# Patient Record
Sex: FEMALE | Race: WHITE | NOT HISPANIC OR LATINO | URBAN - METROPOLITAN AREA
[De-identification: names, ages, dates, MRNs, and addresses within clinical notes are randomized per-mention and may not be internally consistent; named-entity substitution may affect disease eponyms.]

---

## 2017-04-09 ENCOUNTER — EMERGENCY (EMERGENCY)
Facility: HOSPITAL | Age: 47
LOS: 1 days | Discharge: HOME | End: 2017-04-09
Attending: EMERGENCY MEDICINE | Admitting: EMERGENCY MEDICINE
Payer: COMMERCIAL

## 2017-04-09 VITALS
HEART RATE: 98 BPM | TEMPERATURE: 98 F | DIASTOLIC BLOOD PRESSURE: 80 MMHG | OXYGEN SATURATION: 100 % | RESPIRATION RATE: 18 BRPM | SYSTOLIC BLOOD PRESSURE: 125 MMHG

## 2017-04-09 VITALS
DIASTOLIC BLOOD PRESSURE: 86 MMHG | RESPIRATION RATE: 16 BRPM | OXYGEN SATURATION: 98 % | HEART RATE: 87 BPM | SYSTOLIC BLOOD PRESSURE: 110 MMHG

## 2017-04-09 PROCEDURE — 99284 EMERGENCY DEPT VISIT MOD MDM: CPT | Mod: 25

## 2017-04-09 PROCEDURE — 99283 EMERGENCY DEPT VISIT LOW MDM: CPT | Mod: 25

## 2017-04-09 PROCEDURE — 93005 ELECTROCARDIOGRAM TRACING: CPT

## 2017-04-09 PROCEDURE — 93010 ELECTROCARDIOGRAM REPORT: CPT | Mod: NC

## 2017-04-09 RX ORDER — MECLIZINE HCL 12.5 MG
12.5 TABLET ORAL ONCE
Qty: 0 | Refills: 0 | Status: COMPLETED | OUTPATIENT
Start: 2017-04-09 | End: 2017-04-09

## 2017-04-09 RX ADMIN — Medication 12.5 MILLIGRAM(S): at 10:34

## 2017-04-09 NOTE — ED PROVIDER NOTE - ATTENDING CONTRIBUTION TO CARE
intermittent vertigo symptoms with secondary palpitations and nausea that resolved spontaneously, on my exam:  pleasant, cooperative, thin, unable to reproduce symptoms, no nystagmus Bro Fried MD (attending)

## 2017-04-09 NOTE — ED PROVIDER NOTE - OBJECTIVE STATEMENT
45yo p/w "fast heart rate," dizziness this morning when getting out of bed. Pt. got back in bed. Reports dizziness was prior to tachycardia. history of SVT, s/p ablation. Reports that this does not feel like her previous SVT. Denies shortness of breath, chest pain, prolonged stasis.

## 2017-04-09 NOTE — ED ADULT NURSE NOTE - OBJECTIVE STATEMENT
45 y/o F, reported to ED from home via EMS. A&Ox3, c/o dizziness and heart palpitations. Pt reports that she got up this morning and had a sudden onset dizziness. Pt reports that she felt as if the whole room was spinning and she was spinning simultaneously. Pt denies "blacking out" and denies falling. Pt reports that she just couldn't stand up on her own. Pt reports that her episode lasted about 15 minutes. 47 y/o F, reported to ED from home via EMS. A&Ox3, c/o dizziness and heart palpitations. Pt reports that she got up this morning and had a sudden onset dizziness. Pt reports that she felt as if the whole room was spinning and she was spinning simultaneously. Pt denies "blacking out" and denies falling. Pt reports that she just couldn't stand up on her own. Pt reports that her episode lasted about 15 minutes. Pt reports that she felt nauseous but denies vomiting. Pt reports that during this episode she was unstable on her feet.  Pt's BP was 144/105 right arm and 143/104 left arm as per EMS upon their arrival. Pt denies LOC, H/A, SOB, C/P, V/D, abd pain. Pt denies fever or chills. Pt denies sick contact. Pt has a hx of SVT, ablation and depression.  at bedside. Will continue to monitor pt.

## 2017-04-13 DIAGNOSIS — R00.0 TACHYCARDIA, UNSPECIFIED: ICD-10-CM

## 2017-04-13 DIAGNOSIS — R42 DIZZINESS AND GIDDINESS: ICD-10-CM

## 2017-04-13 DIAGNOSIS — R00.2 PALPITATIONS: ICD-10-CM

## 2017-09-30 ENCOUNTER — EMERGENCY (EMERGENCY)
Facility: HOSPITAL | Age: 47
LOS: 1 days | Discharge: HOME | End: 2017-09-30
Attending: EMERGENCY MEDICINE | Admitting: EMERGENCY MEDICINE
Payer: COMMERCIAL

## 2017-09-30 VITALS
DIASTOLIC BLOOD PRESSURE: 77 MMHG | RESPIRATION RATE: 19 BRPM | SYSTOLIC BLOOD PRESSURE: 109 MMHG | HEART RATE: 82 BPM | OXYGEN SATURATION: 100 %

## 2017-09-30 VITALS
HEART RATE: 97 BPM | OXYGEN SATURATION: 100 % | SYSTOLIC BLOOD PRESSURE: 142 MMHG | DIASTOLIC BLOOD PRESSURE: 96 MMHG | RESPIRATION RATE: 20 BRPM | TEMPERATURE: 98 F

## 2017-09-30 LAB
ALBUMIN SERPL ELPH-MCNC: 5 G/DL — SIGNIFICANT CHANGE UP (ref 3.3–5)
ALP SERPL-CCNC: 36 U/L — LOW (ref 40–120)
ALT FLD-CCNC: 18 U/L RC — SIGNIFICANT CHANGE UP (ref 10–45)
ANION GAP SERPL CALC-SCNC: 15 MMOL/L — SIGNIFICANT CHANGE UP (ref 5–17)
APPEARANCE UR: CLEAR — SIGNIFICANT CHANGE UP
APTT BLD: 26.3 SEC — LOW (ref 27.5–37.4)
AST SERPL-CCNC: 24 U/L — SIGNIFICANT CHANGE UP (ref 10–40)
BILIRUB SERPL-MCNC: 0.6 MG/DL — SIGNIFICANT CHANGE UP (ref 0.2–1.2)
BILIRUB UR-MCNC: NEGATIVE — SIGNIFICANT CHANGE UP
BUN SERPL-MCNC: 19 MG/DL — SIGNIFICANT CHANGE UP (ref 7–23)
CALCIUM SERPL-MCNC: 9.7 MG/DL — SIGNIFICANT CHANGE UP (ref 8.4–10.5)
CHLORIDE SERPL-SCNC: 98 MMOL/L — SIGNIFICANT CHANGE UP (ref 96–108)
CO2 SERPL-SCNC: 23 MMOL/L — SIGNIFICANT CHANGE UP (ref 22–31)
COLOR SPEC: YELLOW — SIGNIFICANT CHANGE UP
CREAT SERPL-MCNC: 0.75 MG/DL — SIGNIFICANT CHANGE UP (ref 0.5–1.3)
D DIMER BLD IA.RAPID-MCNC: <150 NG/ML DDU — SIGNIFICANT CHANGE UP
DIFF PNL FLD: NEGATIVE — SIGNIFICANT CHANGE UP
GLUCOSE SERPL-MCNC: 124 MG/DL — HIGH (ref 70–99)
GLUCOSE UR QL: NEGATIVE — SIGNIFICANT CHANGE UP
HCG SERPL-ACNC: <2 MIU/ML — SIGNIFICANT CHANGE UP
HCT VFR BLD CALC: 41.3 % — SIGNIFICANT CHANGE UP (ref 34.5–45)
HGB BLD-MCNC: 14.8 G/DL — SIGNIFICANT CHANGE UP (ref 11.5–15.5)
INR BLD: 0.9 RATIO — SIGNIFICANT CHANGE UP (ref 0.88–1.16)
KETONES UR-MCNC: ABNORMAL
LEUKOCYTE ESTERASE UR-ACNC: NEGATIVE — SIGNIFICANT CHANGE UP
MCHC RBC-ENTMCNC: 35.9 GM/DL — SIGNIFICANT CHANGE UP (ref 32–36)
MCHC RBC-ENTMCNC: 36.1 PG — HIGH (ref 27–34)
MCV RBC AUTO: 100 FL — SIGNIFICANT CHANGE UP (ref 80–100)
NITRITE UR-MCNC: NEGATIVE — SIGNIFICANT CHANGE UP
PH UR: 6.5 — SIGNIFICANT CHANGE UP (ref 5–8)
PLATELET # BLD AUTO: 312 K/UL — SIGNIFICANT CHANGE UP (ref 150–400)
POTASSIUM SERPL-MCNC: 4.6 MMOL/L — SIGNIFICANT CHANGE UP (ref 3.5–5.3)
POTASSIUM SERPL-SCNC: 4.6 MMOL/L — SIGNIFICANT CHANGE UP (ref 3.5–5.3)
PROT SERPL-MCNC: 8.3 G/DL — SIGNIFICANT CHANGE UP (ref 6–8.3)
PROT UR-MCNC: NEGATIVE — SIGNIFICANT CHANGE UP
PROTHROM AB SERPL-ACNC: 9.8 SEC — SIGNIFICANT CHANGE UP (ref 9.8–12.7)
RBC # BLD: 4.12 M/UL — SIGNIFICANT CHANGE UP (ref 3.8–5.2)
RBC # FLD: 11.2 % — SIGNIFICANT CHANGE UP (ref 10.3–14.5)
SODIUM SERPL-SCNC: 136 MMOL/L — SIGNIFICANT CHANGE UP (ref 135–145)
SP GR SPEC: 1.02 — SIGNIFICANT CHANGE UP (ref 1.01–1.02)
TROPONIN T SERPL-MCNC: <0.01 NG/ML — SIGNIFICANT CHANGE UP (ref 0–0.06)
TROPONIN T SERPL-MCNC: <0.01 NG/ML — SIGNIFICANT CHANGE UP (ref 0–0.06)
UROBILINOGEN FLD QL: NEGATIVE — SIGNIFICANT CHANGE UP
WBC # BLD: 5.3 K/UL — SIGNIFICANT CHANGE UP (ref 3.8–10.5)
WBC # FLD AUTO: 5.3 K/UL — SIGNIFICANT CHANGE UP (ref 3.8–10.5)

## 2017-09-30 PROCEDURE — 80053 COMPREHEN METABOLIC PANEL: CPT

## 2017-09-30 PROCEDURE — 84702 CHORIONIC GONADOTROPIN TEST: CPT

## 2017-09-30 PROCEDURE — 84484 ASSAY OF TROPONIN QUANT: CPT

## 2017-09-30 PROCEDURE — 85027 COMPLETE CBC AUTOMATED: CPT

## 2017-09-30 PROCEDURE — 87086 URINE CULTURE/COLONY COUNT: CPT

## 2017-09-30 PROCEDURE — 85610 PROTHROMBIN TIME: CPT

## 2017-09-30 PROCEDURE — 99285 EMERGENCY DEPT VISIT HI MDM: CPT | Mod: 25

## 2017-09-30 PROCEDURE — 85379 FIBRIN DEGRADATION QUANT: CPT

## 2017-09-30 PROCEDURE — 81003 URINALYSIS AUTO W/O SCOPE: CPT

## 2017-09-30 PROCEDURE — 93010 ELECTROCARDIOGRAM REPORT: CPT

## 2017-09-30 PROCEDURE — 99284 EMERGENCY DEPT VISIT MOD MDM: CPT | Mod: 25

## 2017-09-30 PROCEDURE — 85730 THROMBOPLASTIN TIME PARTIAL: CPT

## 2017-09-30 PROCEDURE — 93005 ELECTROCARDIOGRAM TRACING: CPT

## 2017-09-30 RX ORDER — SODIUM CHLORIDE 9 MG/ML
1000 INJECTION INTRAMUSCULAR; INTRAVENOUS; SUBCUTANEOUS ONCE
Qty: 0 | Refills: 0 | Status: COMPLETED | OUTPATIENT
Start: 2017-09-30 | End: 2017-09-30

## 2017-09-30 RX ADMIN — SODIUM CHLORIDE 1000 MILLILITER(S): 9 INJECTION INTRAMUSCULAR; INTRAVENOUS; SUBCUTANEOUS at 10:20

## 2017-09-30 NOTE — ED PROVIDER NOTE - MEDICAL DECISION MAKING DETAILS
Pt is a 46 y/o female with PMH of SVT who p/w c/o palpitations. Pt is a 48 y/o female with PMH of SVT who p/w c/o palpitations. Patient states that this AM she had 2 cups of coffee and went to the gym, and while working out at 4:30 am she felt her heart racing and a "burning sensation" in her chest, now resolved. 1.5 hrs prior to coming to the ED, she had another similar episode of tachycardia and burning in her chest which prompted her to come in for an evaluation. Denies recent f/c, travel/trauma/immobilization, leg pain or swelling. She also takes an amphetamine-based medication for concentration.   DDx: pSVT, other arrhythmia, dehydration. Less likely: PE, ACS  Plan: basic labs, ekg, upreg, trop x 2, ddimer

## 2017-09-30 NOTE — ED PROVIDER NOTE - PHYSICAL EXAMINATION
Physical Exam: NAD, AAOx3, NCAT, MMM, neck is supple, PERRL, CTAB, + resolved tachycardia and regular rhythm, abdomen is soft and NTND, No edema, No deformity of extremities, No rashes, CN grossly intact, No focal motor or sensory deficits. ~ Micky Shin MD

## 2017-09-30 NOTE — ED PROVIDER NOTE - NS ED ROS FT
No fever, no chills, no change in vision, no change in hearing, + chest pain, + shortness of breath, no abdominal pain, no vomiting, no dysuria, no muscle pain, no rashes, no loss of consciousness. ~ Micky Shin MD

## 2017-09-30 NOTE — ED PROVIDER NOTE - OBJECTIVE STATEMENT
Micky Shin MD (resident): 47 F w/ Hx of SVT s/p ablation 10 yrs ago, who p/w palpitations. Today pt had coffee, and went to work-out with cardio at 4:30 am and started to have palpitations and lightheadedness and found HR was 150 by pulse check. Pt had mild chest tightness and burning this episode w/ mild SOB. Palp lasted for 2 hours. Pt did not have syncope. No recent travel, immobilization, leg swelling, OCPs, or recent surgery/traumas.     PMD: Luis Parekh  Card: Arrhythmia clinic at [place]

## 2017-09-30 NOTE — ED PROVIDER NOTE - PROGRESS NOTE DETAILS
NINA Barton MD: 2nd trop and d-dimer negative. Pt continues to feel well, has had no further episodes of CP/palpitations. Plan is to d/c home with instructions to f/u with her Cardiologist in 1-2 days.

## 2017-09-30 NOTE — ED ADULT NURSE NOTE - OBJECTIVE STATEMENT
48 yo female presents in the ed for palpitations and feeling a fast hr. Pt is alert and oriented x4, speaking coherently. pt states that she has been having a fast hr on and off for the past month. Pt states that she woke up this morning, drank 2 cups of coffee and tried to work out and she felt diophretic and palpitation. Pt states that she did not have chest pain. Pt lung clear to ascultation, abdomen soft, non tender. Pt denies chest pain, SOB, fever, chills, nausea, vomiting. MD at bedside, will continue to reassess. pt states that she had a history of SVT with an ablation 10 years ago

## 2017-10-01 LAB
CULTURE RESULTS: SIGNIFICANT CHANGE UP
SPECIMEN SOURCE: SIGNIFICANT CHANGE UP

## 2017-11-16 ENCOUNTER — TRANSCRIPTION ENCOUNTER (OUTPATIENT)
Age: 47
End: 2017-11-16

## 2018-06-01 ENCOUNTER — TRANSCRIPTION ENCOUNTER (OUTPATIENT)
Age: 48
End: 2018-06-01

## 2018-06-20 ENCOUNTER — EMERGENCY (EMERGENCY)
Facility: HOSPITAL | Age: 48
LOS: 1 days | Discharge: HOME | End: 2018-06-20
Attending: EMERGENCY MEDICINE
Payer: COMMERCIAL

## 2018-06-20 ENCOUNTER — TRANSCRIPTION ENCOUNTER (OUTPATIENT)
Age: 48
End: 2018-06-20

## 2018-06-20 VITALS
OXYGEN SATURATION: 100 % | DIASTOLIC BLOOD PRESSURE: 93 MMHG | RESPIRATION RATE: 16 BRPM | SYSTOLIC BLOOD PRESSURE: 140 MMHG | HEART RATE: 83 BPM

## 2018-06-20 VITALS
OXYGEN SATURATION: 100 % | SYSTOLIC BLOOD PRESSURE: 130 MMHG | RESPIRATION RATE: 18 BRPM | DIASTOLIC BLOOD PRESSURE: 88 MMHG | HEART RATE: 82 BPM

## 2018-06-20 LAB
ALBUMIN SERPL ELPH-MCNC: 4.3 G/DL — SIGNIFICANT CHANGE UP (ref 3.3–5)
ALP SERPL-CCNC: 37 U/L — LOW (ref 40–120)
ALT FLD-CCNC: 11 U/L — SIGNIFICANT CHANGE UP (ref 10–45)
ANION GAP SERPL CALC-SCNC: 14 MMOL/L — SIGNIFICANT CHANGE UP (ref 5–17)
AST SERPL-CCNC: 27 U/L — SIGNIFICANT CHANGE UP (ref 10–40)
BASOPHILS # BLD AUTO: 0 K/UL — SIGNIFICANT CHANGE UP (ref 0–0.2)
BASOPHILS NFR BLD AUTO: 0.8 % — SIGNIFICANT CHANGE UP (ref 0–2)
BILIRUB SERPL-MCNC: 0.4 MG/DL — SIGNIFICANT CHANGE UP (ref 0.2–1.2)
BUN SERPL-MCNC: 12 MG/DL — SIGNIFICANT CHANGE UP (ref 7–23)
CALCIUM SERPL-MCNC: 9.3 MG/DL — SIGNIFICANT CHANGE UP (ref 8.4–10.5)
CHLORIDE SERPL-SCNC: 99 MMOL/L — SIGNIFICANT CHANGE UP (ref 96–108)
CO2 SERPL-SCNC: 21 MMOL/L — LOW (ref 22–31)
CREAT SERPL-MCNC: 0.64 MG/DL — SIGNIFICANT CHANGE UP (ref 0.5–1.3)
D DIMER BLD IA.RAPID-MCNC: <150 NG/ML DDU — SIGNIFICANT CHANGE UP
EOSINOPHIL # BLD AUTO: 0.1 K/UL — SIGNIFICANT CHANGE UP (ref 0–0.5)
EOSINOPHIL NFR BLD AUTO: 1.2 % — SIGNIFICANT CHANGE UP (ref 0–6)
GLUCOSE SERPL-MCNC: 97 MG/DL — SIGNIFICANT CHANGE UP (ref 70–99)
HCT VFR BLD CALC: 42.9 % — SIGNIFICANT CHANGE UP (ref 34.5–45)
HGB BLD-MCNC: 14.6 G/DL — SIGNIFICANT CHANGE UP (ref 11.5–15.5)
LYMPHOCYTES # BLD AUTO: 0.9 K/UL — LOW (ref 1–3.3)
LYMPHOCYTES # BLD AUTO: 18.2 % — SIGNIFICANT CHANGE UP (ref 13–44)
MAGNESIUM SERPL-MCNC: 2 MG/DL — SIGNIFICANT CHANGE UP (ref 1.6–2.6)
MCHC RBC-ENTMCNC: 32.9 PG — SIGNIFICANT CHANGE UP (ref 27–34)
MCHC RBC-ENTMCNC: 34 GM/DL — SIGNIFICANT CHANGE UP (ref 32–36)
MCV RBC AUTO: 96.6 FL — SIGNIFICANT CHANGE UP (ref 80–100)
MONOCYTES # BLD AUTO: 0.4 K/UL — SIGNIFICANT CHANGE UP (ref 0–0.9)
MONOCYTES NFR BLD AUTO: 7.8 % — SIGNIFICANT CHANGE UP (ref 2–14)
NEUTROPHILS # BLD AUTO: 3.8 K/UL — SIGNIFICANT CHANGE UP (ref 1.8–7.4)
NEUTROPHILS NFR BLD AUTO: 72 % — SIGNIFICANT CHANGE UP (ref 43–77)
PHOSPHATE SERPL-MCNC: 3.1 MG/DL — SIGNIFICANT CHANGE UP (ref 2.5–4.5)
PLATELET # BLD AUTO: 233 K/UL — SIGNIFICANT CHANGE UP (ref 150–400)
POTASSIUM SERPL-MCNC: 4.4 MMOL/L — SIGNIFICANT CHANGE UP (ref 3.5–5.3)
POTASSIUM SERPL-SCNC: 4.4 MMOL/L — SIGNIFICANT CHANGE UP (ref 3.5–5.3)
PROT SERPL-MCNC: 7.5 G/DL — SIGNIFICANT CHANGE UP (ref 6–8.3)
RBC # BLD: 4.43 M/UL — SIGNIFICANT CHANGE UP (ref 3.8–5.2)
RBC # FLD: 11.5 % — SIGNIFICANT CHANGE UP (ref 10.3–14.5)
SODIUM SERPL-SCNC: 134 MMOL/L — LOW (ref 135–145)
TROPONIN T, HIGH SENSITIVITY RESULT: <6 NG/L — SIGNIFICANT CHANGE UP (ref 0–51)
WBC # BLD: 5.2 K/UL — SIGNIFICANT CHANGE UP (ref 3.8–10.5)
WBC # FLD AUTO: 5.2 K/UL — SIGNIFICANT CHANGE UP (ref 3.8–10.5)

## 2018-06-20 PROCEDURE — 99284 EMERGENCY DEPT VISIT MOD MDM: CPT | Mod: 25

## 2018-06-20 PROCEDURE — 71046 X-RAY EXAM CHEST 2 VIEWS: CPT

## 2018-06-20 PROCEDURE — 71046 X-RAY EXAM CHEST 2 VIEWS: CPT | Mod: 26

## 2018-06-20 PROCEDURE — 83735 ASSAY OF MAGNESIUM: CPT

## 2018-06-20 PROCEDURE — 85027 COMPLETE CBC AUTOMATED: CPT

## 2018-06-20 PROCEDURE — 93010 ELECTROCARDIOGRAM REPORT: CPT

## 2018-06-20 PROCEDURE — 84484 ASSAY OF TROPONIN QUANT: CPT

## 2018-06-20 PROCEDURE — 80053 COMPREHEN METABOLIC PANEL: CPT

## 2018-06-20 PROCEDURE — 85379 FIBRIN DEGRADATION QUANT: CPT

## 2018-06-20 PROCEDURE — 99285 EMERGENCY DEPT VISIT HI MDM: CPT | Mod: 25

## 2018-06-20 PROCEDURE — 93005 ELECTROCARDIOGRAM TRACING: CPT

## 2018-06-20 PROCEDURE — 84100 ASSAY OF PHOSPHORUS: CPT

## 2018-06-20 RX ORDER — SODIUM CHLORIDE 9 MG/ML
1000 INJECTION INTRAMUSCULAR; INTRAVENOUS; SUBCUTANEOUS ONCE
Qty: 0 | Refills: 0 | Status: COMPLETED | OUTPATIENT
Start: 2018-06-20 | End: 2018-06-20

## 2018-06-20 RX ADMIN — SODIUM CHLORIDE 1000 MILLILITER(S): 9 INJECTION INTRAMUSCULAR; INTRAVENOUS; SUBCUTANEOUS at 19:03

## 2018-06-20 NOTE — ED ADULT NURSE NOTE - OBJECTIVE STATEMENT
48F comes to ED via EMS c/o episode of palpitations and sweatiness. Patient states she was at work when she developed the sensation of palpitations suddenly. States she had PMH ablation for SVT in 2010. She attempted vagal maneuvers with no response. She went to urgent care where she was sent to ED for further evaluation. She states she recently added a new medication (lexapro) to her routine. She also takes adderall. She states since coming to ED she is symptom free. Denies dizziness/SOB/chest pain/N/V/D/dizziness/fever/chills/abdominal pain. EKG was performed immediately and patient was placed on cardiac monitor. She is a&ox3 and in no acute distress. Will continue to monitor.

## 2018-06-20 NOTE — ED PROVIDER NOTE - OBJECTIVE STATEMENT
49 yo female with PMH of SVT s/p ablation 10 years ago, anxiety and depression and ADD on adderall and started on Lexapro and tapered down on Adderall two days ago by her PMD presenting to the ED      49 yo F presents with acute onset palpitations that started around 2:30pm this afternoon. started while she was working, not exerting herself. a/w lightheadedness, facial flushing. tried vagal maneuvers and carotid massage without significant improvement. went to urgent care who did EKG, . over period of 1 hour all her symptoms resolved and now she has no complaints. returned from flight from [place] last week. no other PE risk factors.   h/o SVT s/p ablation 10 years ago. 49 yo female with PMH of SVT s/p ablation 10 years ago, anxiety and depression and ADD on Adderall and started on Lexapro and tapered down on Adderall two days ago by her PMD presenting to the ED for an episode of palpitations and lightheadedness earlier today that started at approximately 2:30 pm and lasted approximately 1-1.5 hours. Pt states her symptoms gradually improved at that time. She was working at her job as a substance abuse counselor, sitting at a meeting when the symptoms came on, a physician she works with performed a carotid massage and instructed her to perform a vagal maneuver without resolution of her symptoms.   Pt opted to stay at work then went to an urgent care center where they found her heart rate to be about 102 and sent her to go to the ED for further w/u. Pt denies fever, chills, chest pain, SOB, POTTS, back pain, abdominal pain, cough, n/v, urinary symptoms, diarrhea, constipation, trauma, rash. Recently traveled to and from [place] during Father's day weekend but moved around the cabin during the flight, not on OCP's or horemone therapy, no personal or family h/o PE or DVT.   Pt states today's symptoms did not feel at all like her previous SVT 10 years ago. Back then symptom onset and resolution was abrupt, heart felt like it was beating out of chest back then, much more severe symptoms, and responded to vagal and valsalva maneuver whereas today did not have these features.

## 2018-06-20 NOTE — ED PROVIDER NOTE - ATTENDING CONTRIBUTION TO CARE
47 yo F presents with acute onset palpitations that started around 2:30pm this afternoon. started while she was working, not exerting herself. a/w lightheadedness, facial flushing. tried vagal maneuvers and carotid massage without significant improvement. went to urgent care who did EKG, . over period of 1 hour all her symptoms resolved and now she has no complaints. returned from flight from [place] last week. no other PE risk factors.   h/o SVT s/p ablation 10 years ago.   PE normal. lungs CTA. 49 yo F presents with acute onset palpitations that started around 2:30pm this afternoon. started while she was working, not exerting herself. a/w lightheadedness, facial flushing. tried vagal maneuvers and carotid massage without significant improvement. went to urgent care who did EKG, . over period of 1 hour all her symptoms resolved and now she has no complaints. returned from flight from [place] last week. no other PE risk factors.   h/o SVT s/p ablation 10 years ago.   PE normal. lungs CTA.  ekg With normal rate. Chest x-ray with no acute disease. Labs are normal.  Patient was observed in the ER for several hours. She remained without any complaint threat or ED stay and in normal sinus rhythm with normal rate throughout. Patient has no signs or symptoms concerning for ACS or PE at this time. Patient was discharged with instructions to drink plenty of fluids, and follow-up with her cardiologist in 1 to 2 days for repeat evaluation for the management    The patient was discharged from the ED in stable condition. All results of today's workup were discussed with the patient and all questions/concerns were addressed. All discharge instructions were thoroughly discussed with the patient, as well as important warning signs and new/ worsening symptoms which should necessitate patient's immediate return to the ED. The patient is agreeable with discharge and expresses full understanding of all instructions given.

## 2018-06-20 NOTE — ED PROVIDER NOTE - MEDICAL DECISION MAKING DETAILS
Pt presenting with palpitations earlier today, symptoms resolved prior to arrival in ED. Will eval for ACS, PE. Plan: EKG, CXR, labs including troponin and d-dimer; will likely have pt f/u with PMD. Likely related to recent change in Adderall dosing, addition of Lexapro, and/or h/o anxiety; low clinical suspicion for emergent pathology given H&P.

## 2018-06-20 NOTE — ED ADULT NURSE NOTE - CHPI ED SYMPTOMS NEG
no shortness of breath/no nausea/no vomiting/no diaphoresis/no chills/no back pain/no chest pain/no syncope/no fever/no cough/no dizziness

## 2018-11-14 NOTE — ED ADULT NURSE NOTE - TEMPLATE LIST FOR HEAD TO TOE ASSESSMENT
Problem: PHYSICAL THERAPY ADULT  Goal: Performs mobility at highest level of function for planned discharge setting  See evaluation for individualized goals  Treatment/Interventions: Functional transfer training, LE strengthening/ROM, Elevations, Therapeutic exercise, Endurance training, Patient/family training, Equipment eval/education, Bed mobility, Gait training, Compensatory technique education, Continued evaluation, Spoke to nursing, OT, Family  Equipment Recommended: Katia Arnold (RW   Monitor )       See flowsheet documentation for full assessment, interventions and recommendations  Outcome: Completed Date Met: 11/14/18  Prognosis: Good  Problem List: Decreased endurance, Impaired balance, Impaired sensation, Decreased strength, Decreased mobility  Assessment: PT progressed to performing transfers with I  Trialed ambulation without use of an AD on levels  Pt demonstrates steady gait on levels, fluent reciprocal gait pattern  NO unsteadiness or LOB and safe mobility without use of and AD  Progressed Pt to using one rail with stair climbing with supervision assist  Pt demonstrates safe transfer, ambulation and stair climbing techniques  Pt progressed with ambulation distances to 385'  Pt performed higher level balance activities with unsteadiness and lob noted with forward marching, and front cross overs requiring min assist and or unilateral ue support of counter  PT has achieved inpt PT goals no further inpt PT required at this time  Pt encouraged to ambulate on unit with family staff or I'ly without or without Rw depending of fatigue level and balance    Recommend d/c to home with family support and OPPT transitioning to lifestyle management program   Barriers to Discharge: Inaccessible home environment (2nd floor shower)     Recommendation: Home with family support, Outpatient PT (OPPt transitioning to lifestyle management program  )     PT - OK to Discharge: Yes    See flowsheet documentation for full assessment  Cardiac

## 2021-09-30 NOTE — ED PROVIDER NOTE - NEURO NEGATIVE STATEMENT, MLM
[Negative] : Heme/Lymph
no loss of consciousness, no gait abnormality, no headache, no sensory deficits, and no weakness.

## 2022-06-20 NOTE — ED ADULT NURSE NOTE - NSSISCREENINGQ3_ED_A_ED
[Clear] : right tympanic membrane not clear [Erythema] : erythema [Bulging] : bulging [Purulent Effusion] : purulent effusion [NL] : warm, clear No

## 2024-03-29 NOTE — ED PROVIDER NOTE - DISCHARGE REVIEW MATERIAL PRESENTED
Follow up office visit in 1 year.    Continue same medications/treatment.  Patient educated on proper medication use.  Patient educated on risk factor modification.  Please bring any lab results from other providers / physicians to your next appointment.    Please bring all medicines, vitamins and herbal supplements with you when you come to the office.    Prescriptions will not be filled unless you are compliant with your follow up appointments or have a follow up  appointment scheduled as per instruction of your physician.  Refills should be requested at the time of  Your visit.    Jess DAVIDSON LPN, am scribing for and in the presence of  Dr. Matt Hyatt MD, FACC    
.

## 2024-04-02 PROBLEM — I47.1 SUPRAVENTRICULAR TACHYCARDIA: Chronic | Status: ACTIVE | Noted: 2017-09-30

## 2024-05-08 ENCOUNTER — APPOINTMENT (OUTPATIENT)
Dept: OBGYN | Facility: CLINIC | Age: 54
End: 2024-05-08